# Patient Record
Sex: FEMALE | Race: WHITE | URBAN - METROPOLITAN AREA
[De-identification: names, ages, dates, MRNs, and addresses within clinical notes are randomized per-mention and may not be internally consistent; named-entity substitution may affect disease eponyms.]

---

## 2023-08-31 ENCOUNTER — APPOINTMENT (OUTPATIENT)
Dept: INTERNAL MEDICINE CLINIC | Facility: HOSPITAL | Age: 28
End: 2023-08-31
Attending: EMERGENCY MEDICINE

## 2024-03-18 ENCOUNTER — TELEPHONE (OUTPATIENT)
Dept: FAMILY MEDICINE CLINIC | Facility: CLINIC | Age: 29
End: 2024-03-18

## 2024-04-03 ENCOUNTER — OFFICE VISIT (OUTPATIENT)
Dept: FAMILY MEDICINE CLINIC | Facility: CLINIC | Age: 29
End: 2024-04-03
Payer: COMMERCIAL

## 2024-04-03 ENCOUNTER — LAB ENCOUNTER (OUTPATIENT)
Dept: LAB | Age: 29
End: 2024-04-03
Attending: STUDENT IN AN ORGANIZED HEALTH CARE EDUCATION/TRAINING PROGRAM
Payer: COMMERCIAL

## 2024-04-03 VITALS
BODY MASS INDEX: 20.63 KG/M2 | SYSTOLIC BLOOD PRESSURE: 94 MMHG | HEART RATE: 73 BPM | OXYGEN SATURATION: 100 % | DIASTOLIC BLOOD PRESSURE: 56 MMHG | HEIGHT: 64 IN | WEIGHT: 120.81 LBS

## 2024-04-03 DIAGNOSIS — Z00.00 ENCOUNTER FOR ROUTINE HISTORY AND PHYSICAL EXAMINATION: Primary | ICD-10-CM

## 2024-04-03 DIAGNOSIS — E55.9 VITAMIN D DEFICIENCY: ICD-10-CM

## 2024-04-03 DIAGNOSIS — Z13.6 SCREENING FOR CARDIOVASCULAR CONDITION: ICD-10-CM

## 2024-04-03 DIAGNOSIS — E61.1 IRON DEFICIENCY: ICD-10-CM

## 2024-04-03 DIAGNOSIS — E61.1 IRON DEFICIENCY: Primary | ICD-10-CM

## 2024-04-03 DIAGNOSIS — F41.9 ANXIETY: ICD-10-CM

## 2024-04-03 LAB
ALBUMIN SERPL-MCNC: 4.4 G/DL (ref 3.4–5)
ALBUMIN/GLOB SERPL: 1.3 {RATIO} (ref 1–2)
ALP LIVER SERPL-CCNC: 47 U/L
ALT SERPL-CCNC: 29 U/L
ANION GAP SERPL CALC-SCNC: 10 MMOL/L (ref 0–18)
AST SERPL-CCNC: 24 U/L (ref 15–37)
BASOPHILS # BLD AUTO: 0.04 X10(3) UL (ref 0–0.2)
BASOPHILS NFR BLD AUTO: 0.4 %
BILIRUB SERPL-MCNC: 0.3 MG/DL (ref 0.1–2)
BUN BLD-MCNC: 20 MG/DL (ref 9–23)
CALCIUM BLD-MCNC: 9.6 MG/DL (ref 8.5–10.1)
CHLORIDE SERPL-SCNC: 105 MMOL/L (ref 98–112)
CHOLEST SERPL-MCNC: 132 MG/DL (ref ?–200)
CO2 SERPL-SCNC: 22 MMOL/L (ref 21–32)
CREAT BLD-MCNC: 0.81 MG/DL
DEPRECATED HBV CORE AB SER IA-ACNC: 6.1 NG/ML
EGFRCR SERPLBLD CKD-EPI 2021: 103 ML/MIN/1.73M2 (ref 60–?)
EOSINOPHIL # BLD AUTO: 0.11 X10(3) UL (ref 0–0.7)
EOSINOPHIL NFR BLD AUTO: 1.2 %
ERYTHROCYTE [DISTWIDTH] IN BLOOD BY AUTOMATED COUNT: 17.2 %
FASTING PATIENT LIPID ANSWER: YES
FASTING STATUS PATIENT QL REPORTED: YES
GLOBULIN PLAS-MCNC: 3.5 G/DL (ref 2.8–4.4)
GLUCOSE BLD-MCNC: 78 MG/DL (ref 70–99)
HCT VFR BLD AUTO: 37.3 %
HDLC SERPL-MCNC: 45 MG/DL (ref 40–59)
HGB BLD-MCNC: 12.7 G/DL
IMM GRANULOCYTES # BLD AUTO: 0.03 X10(3) UL (ref 0–1)
IMM GRANULOCYTES NFR BLD: 0.3 %
IRON SATN MFR SERPL: 9 %
IRON SERPL-MCNC: 33 UG/DL
LDLC SERPL CALC-MCNC: 78 MG/DL (ref ?–100)
LYMPHOCYTES # BLD AUTO: 2.23 X10(3) UL (ref 1–4)
LYMPHOCYTES NFR BLD AUTO: 24.8 %
MCH RBC QN AUTO: 27.1 PG (ref 26–34)
MCHC RBC AUTO-ENTMCNC: 34 G/DL (ref 31–37)
MCV RBC AUTO: 79.7 FL
MONOCYTES # BLD AUTO: 0.8 X10(3) UL (ref 0.1–1)
MONOCYTES NFR BLD AUTO: 8.9 %
NEUTROPHILS # BLD AUTO: 5.78 X10 (3) UL (ref 1.5–7.7)
NEUTROPHILS # BLD AUTO: 5.78 X10(3) UL (ref 1.5–7.7)
NEUTROPHILS NFR BLD AUTO: 64.4 %
NONHDLC SERPL-MCNC: 87 MG/DL (ref ?–130)
OSMOLALITY SERPL CALC.SUM OF ELEC: 285 MOSM/KG (ref 275–295)
PLATELET # BLD AUTO: 335 10(3)UL (ref 150–450)
POTASSIUM SERPL-SCNC: 3.8 MMOL/L (ref 3.5–5.1)
PROT SERPL-MCNC: 7.9 G/DL (ref 6.4–8.2)
RBC # BLD AUTO: 4.68 X10(6)UL
SODIUM SERPL-SCNC: 137 MMOL/L (ref 136–145)
TIBC SERPL-MCNC: 356 UG/DL (ref 240–450)
TRANSFERRIN SERPL-MCNC: 239 MG/DL (ref 200–360)
TRIGL SERPL-MCNC: 33 MG/DL (ref 30–149)
VIT D+METAB SERPL-MCNC: 32.3 NG/ML (ref 30–100)
VLDLC SERPL CALC-MCNC: 5 MG/DL (ref 0–30)
WBC # BLD AUTO: 9 X10(3) UL (ref 4–11)

## 2024-04-03 PROCEDURE — 99385 PREV VISIT NEW AGE 18-39: CPT | Performed by: STUDENT IN AN ORGANIZED HEALTH CARE EDUCATION/TRAINING PROGRAM

## 2024-04-03 PROCEDURE — 83540 ASSAY OF IRON: CPT

## 2024-04-03 PROCEDURE — 82306 VITAMIN D 25 HYDROXY: CPT

## 2024-04-03 PROCEDURE — 80053 COMPREHEN METABOLIC PANEL: CPT

## 2024-04-03 PROCEDURE — 36415 COLL VENOUS BLD VENIPUNCTURE: CPT

## 2024-04-03 PROCEDURE — 82728 ASSAY OF FERRITIN: CPT

## 2024-04-03 PROCEDURE — 85025 COMPLETE CBC W/AUTO DIFF WBC: CPT

## 2024-04-03 PROCEDURE — 83550 IRON BINDING TEST: CPT

## 2024-04-03 PROCEDURE — 80061 LIPID PANEL: CPT

## 2024-04-03 RX ORDER — BUSPIRONE HYDROCHLORIDE 15 MG/1
30 TABLET ORAL 2 TIMES DAILY
COMMUNITY
End: 2024-04-03

## 2024-04-03 RX ORDER — SERTRALINE HYDROCHLORIDE 100 MG/1
100 TABLET, FILM COATED ORAL 2 TIMES DAILY
Qty: 180 TABLET | Refills: 3 | Status: SHIPPED | OUTPATIENT
Start: 2024-04-03 | End: 2025-04-03

## 2024-04-03 RX ORDER — BUSPIRONE HYDROCHLORIDE 30 MG/1
30 TABLET ORAL 2 TIMES DAILY
Qty: 180 TABLET | Refills: 3 | Status: SHIPPED | OUTPATIENT
Start: 2024-04-03 | End: 2025-04-03

## 2024-04-03 RX ORDER — SERTRALINE HYDROCHLORIDE 100 MG/1
100 TABLET, FILM COATED ORAL 2 TIMES DAILY
COMMUNITY
End: 2024-04-03

## 2024-04-03 RX ORDER — HYDROXYZINE HYDROCHLORIDE 10 MG/1
10 TABLET, FILM COATED ORAL EVERY 8 HOURS PRN
Qty: 30 TABLET | Refills: 0 | Status: SHIPPED | OUTPATIENT
Start: 2024-04-03

## 2024-04-03 RX ORDER — HYDROXYZINE HYDROCHLORIDE 10 MG/1
10 TABLET, FILM COATED ORAL
COMMUNITY
Start: 2019-06-18 | End: 2024-04-03

## 2024-04-03 NOTE — PROGRESS NOTES
[facility] [place]    CC: yearly exam     HPI: Jenifer Benitez is 28 year old female here for a yearly physical.    1. Anxious mood/depressed mood. Zoloft 100 mg BID. Buspar 30 mg BID. Hydroxyzine PRN. She has been on zoloft for about 12 years. Her mood is well controlled.   2. Healthcare maintenance.  Exercise - enjoys running, occasional weight lifting.  Sunscreen - not discussed.  Caffeine - 2-3 cups per day.  Advanced directives- not yet  3. Iron deficiency, notes last time ferritin was checked it was low at 6. Not endorsing dyspnea, palpitations, dizziness. Needs this checked.  4. Vit D deficiency requests lab check     PMH:  There is no problem list on file for this patient.    No past medical history on file.    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 Social alcohol. No drug use.        ROS: full 10 point review of systems done and otherwise negative.      Healthcare Maintenance:  Pap:  Reportedly UTD 2022 with gyn, records requested  COVID19 vaccine: recommend updated 23-24 covid booster  Guardasil:UTD per pt, records requested  Tdap: UTD per pt, records requested    Health Habits:  Dental Exam. UTD  Eye Exam. UTD       GYN:  Birth Control. abstinence     PE:  Vital Signs    04/03/24 0822   BP: 94/56   Pulse: 73   PainSc: 0 - (None)     Wt Readings from Last 3 Encounters:   04/03/24 120 lb 12.8 oz (54.8 kg)     Body mass index is 20.74 kg/m².     General: Comfortable, pleasant, NAD, appears stated age  HEENT.  NC/AT, no conjunctival injection, TMs clear, oropharynx without erythema or exudate, neck supple, no LAD or thyromegaly  CV.  RRR, no m/r/g  Pulm. CTAB, no W/R/R, comfortable work of breathing, speaks in full sentences  Abdomen. Soft, nt, nd  Extremities.  2+ DP pulses, no edema  Skin.  No concerning moles      No visits with results within 4 Week(s) from this  visit.   Latest known visit with results is:   No results found for any previous visit.        A/P: Jenifer Benitez is 28 year old yo female here for complete physical.  1. Anxious mood/depressed mood. Well-controlled on current regimen. Note high dose zoloft which has been split BID by prior provider - she is doing well with this so no changes made today.  2. Healthcare Maintenance. Discussed eye exam, dentist visit q6 months, sunscreen, exercise at least 5x/week, 30 minutes daily, and limiting caffeine intake.  3. Iron deficiency - labs ordered  4. Vit D deficiency requests lab check        Outpatient Encounter Medications as of 4/3/2024   Medication Sig Dispense Refill    sertraline 100 MG Oral Tab Take 1 tablet (100 mg total) by mouth 2 (two) times daily. 180 tablet 3    hydrOXYzine 10 MG Oral Tab Take 1 tablet (10 mg total) by mouth every 8 (eight) hours as needed for Anxiety. 30 tablet 0    busPIRone 30 MG Oral Tab Take 1 tablet (30 mg total) by mouth 2 (two) times daily. 180 tablet 3    [DISCONTINUED] hydrOXYzine 10 MG Oral Tab Take 1 tablet (10 mg total) by mouth.      [DISCONTINUED] busPIRone 15 MG Oral Tab Take 2 tablets (30 mg total) by mouth 2 (two) times daily.      [DISCONTINUED] sertraline 100 MG Oral Tab Take 1 tablet (100 mg total) by mouth 2 (two) times daily.       No facility-administered encounter medications on file as of 4/3/2024.           Side effects, risks and benefits of medications were explained.  The patient or responsible adult showed the ability to learn, asked appropriate questions.  There were no barriers to learning and they verbalized understanding of the treatment plan.     Medication list provided to patient and /or family member.     This note was created in part by Dragon recognition software.  Please excuse errors.

## 2024-05-03 ENCOUNTER — OFFICE VISIT (OUTPATIENT)
Facility: CLINIC | Age: 29
End: 2024-05-03
Payer: COMMERCIAL

## 2024-05-03 VITALS
HEART RATE: 72 BPM | HEIGHT: 64 IN | DIASTOLIC BLOOD PRESSURE: 60 MMHG | BODY MASS INDEX: 20.55 KG/M2 | SYSTOLIC BLOOD PRESSURE: 112 MMHG | WEIGHT: 120.38 LBS

## 2024-05-03 DIAGNOSIS — Z01.419 ENCOUNTER FOR WELL WOMAN EXAM WITH ROUTINE GYNECOLOGICAL EXAM: Primary | ICD-10-CM

## 2024-05-03 DIAGNOSIS — Z12.4 CERVICAL CANCER SCREENING: ICD-10-CM

## 2024-05-03 PROCEDURE — 87624 HPV HI-RISK TYP POOLED RSLT: CPT | Performed by: OBSTETRICS & GYNECOLOGY

## 2024-05-03 PROCEDURE — 88175 CYTOPATH C/V AUTO FLUID REDO: CPT | Performed by: OBSTETRICS & GYNECOLOGY

## 2024-05-03 PROCEDURE — 99385 PREV VISIT NEW AGE 18-39: CPT | Performed by: OBSTETRICS & GYNECOLOGY

## 2024-05-03 NOTE — PROGRESS NOTES
Jenifer Benitez is a 28 year old female  Patient's last menstrual period was 2024 (approximate).   Chief Complaint   Patient presents with    Wellness Visit     Annual Exam     Other     Patient said YES to student    .  Patient has no complaints, declines b.c - not sexually active  OBSTETRICS HISTORY:  OB History    Para Term  AB Living   0 0 0 0 0 0   SAB IAB Ectopic Multiple Live Births   0 0 0 0 0       GYNE HISTORY:  Periods regular monthly    History   Sexual Activity    Sexual activity: Not on file        Hx Prior Abnormal Pap: No  Pap Date:  ( per patient)  Pap Result Notes: Negative (per patient)        MEDICAL HISTORY:  Past Medical History:    Anxiety    Depression    Dysmenorrhea    Useually managable with 800 mg Ibuprofen       SURGICAL HISTORY:  Past Surgical History:   Procedure Laterality Date    Other surgical history  2014    [place] Teeth removal       SOCIAL HISTORY:  Social History     Socioeconomic History    Marital status: Single     Spouse name: Not on file    Number of children: Not on file    Years of education: Not on file    Highest education level: Not on file   Occupational History    Not on file   Tobacco Use    Smoking status: Never    Smokeless tobacco: Never   Vaping Use    Vaping status: Never Used   Substance and Sexual Activity    Alcohol use: Yes     Comment: Social drinking, not on a weekly basis (maybe once a month)    Drug use: Not Currently     Frequency: 1.0 times per week     Types: Cannabis     Comment: Less than once a week in college    Sexual activity: Not on file   Other Topics Concern    Caffeine Concern No    Exercise No    Seat Belt No    Special Diet No    Stress Concern No    Weight Concern No   Social History 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 Social alcohol. No drug use.     Social Determinants of Health     Financial Resource Strain: Not on file   Food Insecurity: Not on file    Transportation Needs: Not on file   Physical Activity: Not on file   Stress: Not on file   Social Connections: Not on file   Housing Stability: Not on file       FAMILY HISTORY:  Family History   Problem Relation Age of Onset    Diabetes Mother     Hypertension Mother     Obesity Mother     Hypertension Father     Diabetes Maternal Grandmother        MEDICATIONS:    Current Outpatient Medications:     sertraline 100 MG Oral Tab, Take 1 tablet (100 mg total) by mouth 2 (two) times daily., Disp: 180 tablet, Rfl: 3    hydrOXYzine 10 MG Oral Tab, Take 1 tablet (10 mg total) by mouth every 8 (eight) hours as needed for Anxiety., Disp: 30 tablet, Rfl: 0    busPIRone 30 MG Oral Tab, Take 1 tablet (30 mg total) by mouth 2 (two) times daily., Disp: 180 tablet, Rfl: 3    ALLERGIES:    Allergies   Allergen Reactions    Seasonal UNKNOWN         Review of Systems:  Constitutional:  Denies fatigue, night sweats, hot flashes  Eyes:  denies blurred or double vision  Cardiovascular:  denies chest pain or palpitations  Respiratory:  denies shortness of breath  Gastrointestinal:  denies heartburn, abdominal pain, diarrhea or constipation  Genitourinary:  denies dysuria, incontinence, abnormal vaginal discharge, vaginal itching  Musculoskeletal:  denies back pain.  Skin/Breast:  Denies any breast pain, lumps, or discharge.   Neurological:  denies headaches, extremity weakness or numbness.  Psychiatric: denies depression or anxiety.  Endocrine:   denies excessive thirst or urination.  Heme/Lymph:  denies history of anemia, easy bruising or bleeding.      PHYSICAL EXAM:   Constitutional: well developed, well nourished  Head/Face: normocephalic  Neck/Thyroid: thyroid symmetric, no thyromegaly, no nodules, no adenopathy  Lymphatic:no abnormal supraclavicular or axillary adenopathy is noted  Breast: normal without palpable masses, tenderness, asymmetry, nipple discharge, nipple retraction or skin changes  Abdomen:  soft, nontender,  nondistended, no masses  Skin/Hair: no unusual rashes or bruises  Extremities: no edema, no cyanosis  Psychiatric:  Oriented to time, place, person and situation. Appropriate mood and affect    Pelvic Exam:  External Genitalia: normal appearance, hair distribution, and no lesions  Urethral Meatus:  normal in size, location, without lesions and prolapse  Bladder:  No fullness, masses or tenderness  Vagina:  Normal appearance without lesions, no abnormal discharge  Cervix:  Normal without tenderness on motion  Uterus: normal in size, contour, position, mobility, without tenderness  Adnexa: normal without masses or tenderness  Perineum: normal  Anus: no hemorroids     Assessment & Plan:  Diagnoses and all orders for this visit:    Encounter for well woman exam with routine gynecological exam    Cervical cancer screening  -     Hpv High Risk , Thin Prep Collect; Future  -     ThinPrep PAP Smear B; Future

## 2024-05-06 LAB — HPV I/H RISK 1 DNA SPEC QL NAA+PROBE: NEGATIVE

## 2024-05-09 LAB
.: NORMAL
.: NORMAL

## 2024-08-28 ENCOUNTER — LAB ENCOUNTER (OUTPATIENT)
Dept: LAB | Facility: HOSPITAL | Age: 29
End: 2024-08-28
Attending: STUDENT IN AN ORGANIZED HEALTH CARE EDUCATION/TRAINING PROGRAM
Payer: COMMERCIAL

## 2024-08-28 DIAGNOSIS — E61.1 IRON DEFICIENCY: ICD-10-CM

## 2024-08-28 LAB
DEPRECATED HBV CORE AB SER IA-ACNC: 16.7 NG/ML
IRON SATN MFR SERPL: 37 %
IRON SERPL-MCNC: 114 UG/DL
TIBC SERPL-MCNC: 308 UG/DL (ref 250–425)
TRANSFERRIN SERPL-MCNC: 207 MG/DL (ref 250–380)

## 2024-08-28 PROCEDURE — 36415 COLL VENOUS BLD VENIPUNCTURE: CPT

## 2024-08-28 PROCEDURE — 82728 ASSAY OF FERRITIN: CPT

## 2024-08-28 PROCEDURE — 84466 ASSAY OF TRANSFERRIN: CPT

## 2024-08-28 PROCEDURE — 83540 ASSAY OF IRON: CPT

## 2024-08-30 DIAGNOSIS — E61.1 IRON DEFICIENCY: Primary | ICD-10-CM

## 2024-09-03 ENCOUNTER — TELEPHONE (OUTPATIENT)
Dept: INTERNAL MEDICINE CLINIC | Facility: HOSPITAL | Age: 29
End: 2024-09-03

## 2024-09-03 NOTE — TELEPHONE ENCOUNTER
Outside Covid Testing done    Results and RTW guidelines:    COVID RESULT reported:      Test type:    [] Rapid outside         [] PCR outside       [x] Home Test    Date of test: 09/30/2024     Test location: Home         [] Result viewed in Epic with verbal consent received from employee     [x] Results per Employee Covid Dashboard    [] Employee instructed to email copy of result to [email or web address]       [x] Discussed with employee     [] Unable to reach by phone.  Sent via Decal[employer] message        [x] Positive    - Employee should quarantine at home for at least 5 days (day 1 is day after sx onset) , follow the CDC guidelines for cleaning and                              quarantining; see CDC.gov   -This employee may RTW on day 6 if asymptomatic or mildly symptomatic (with improving symptoms).  Call Employee Health on day 5 if unable to return on                      day 6 after symptom onset.    -This employee needs to call Employee Health on day 5 after symptom onset.  The employee needs to be cleared by Employee Health.  - If Employee is still experiencing severe symptoms must make a RTW appt with Employee Health, Employee will not be cleared if:    1. Has consistent cough, shortness of breath or fatigue that restricts your physical activities    2. Is still feeling \"unwell\"    3. Within 15 days of hospitalization for COVID    4. Within 20 days of intubation for COVID    5. Still has a fever, vomiting or diarrhea   - Keep communication open with management about RTW and if symptoms worsen    - Monitor sx and temperature    - Employee should quarantine at home for at least 5 days from sx onset, follow the CDC guidelines for cleaning and quarantining; see CDC.gov                  - Notify PCP of result                 - Seek emergent care with worsening symptoms        Notes:     RTW PLAN:    [x]  If COVID positive results, off work minimum of 5 days from positive test or onset of symptoms (day 0)         On day 5, if asymptomatic or mildly symptomatic (with improving symptoms) may return to work day 6          On day 5, if symptomatic, call Employee Health for RTW screening        []  COVID positive result - call Employee Health on day 5 after symptom onset.  The employee needs to be cleared by Employee Health to RTW.  [] RTW immediately, continue to monitor for sx  [] RTW when sx improve; must be fever free for 24 hours w/o medications, Diarrhea/Vomiting free for 24 hours w/o medications  [] Alinity ordered; continue to monitor sx and call for new/worsening sx.  Discuss RTW guidelines with manager             [] Rapid ordered to confirm home negative.   [] May continue to work  [x] Follow up with PCP  [] Home until further instruction from hotline with Alinity results  INSTRUCTIONS PROVIDED:  [x]  Plan as noted above  []  Length of time to obtain results  []  May return to work if views negative in My Chart and  remains fever, vomiting, and diarrhea free  []  May continue to work if remains asymptomatic   [x]  Quarantine instructions  [x]  Masking protocol  []  S/S of worsening infection/condition and importance of prompt medical re-evaluation including when to seek emergency care  [] If symptoms develop, stay home and call hotline for rapid test order    Estimated RTW date:  09/08/2024  [x] Manager notified        [] MISTY  []ZINA   [x] [facility]  Manager : Shayna Johnson    [] Direct Patient Care  []Indirect Patient Contact   [x] Non-Clinical/No Patient Contact    High Risk Area:    [] Yes   [x] No    For Direct Patient Care ONLY: Have you been fitted with an N95 mask? [] Yes  [x]No      HAVE YOU RECEIVED THE COVID-19 Vaccine? Yes [x]    No []          If yes, date(s) received: 04/06/2021 05/04/2021 10/20/2022           Which vaccine:  Pfizer [x]     Moderna [x]    J&J []      SYMPTOMS (reported via dashboard):  [] asymptomatic  [x] symptomatic  [] GI symptoms only    Symptom onset date: 09/02/2024    Fever   > 100F              Yes [x]      Cough                          Yes [x]      Shortness of breath  Yes []      Congestion                 Yes [x]      Runny nose                Yes [x]        Loss of Smell              Yes []        Loss of Taste             Yes []       Sore throat                 Yes [x]       Fatigue                        Yes [x]       Body Aches                Yes [x]        Chills                           Yes [x]        Headache                   Yes [x]             GI symptoms             Yes []     No [x]                     Nausea   []          Vomiting            []                                    Diarrhea  []          Upset stomach []      Employee reported COVID Exposure?  Yes [x]     No []    Date of exposure: 08/30/2024  []  Coworker                       [] patient                        [x] Family/friend    PPE:   [] N95 Mask/PAPR  [] Standard Mask  [] Eyewear  [x] None    Within 6 feet for >15 minutes? [x] Yes []  No    Is this a true exposure? [x]  Yes []  No    When was the last shift you worked?: 09/02/2024    Employee has a history of Covid?  Yes []     No [x]   If Yes, when:    Notes:

## 2024-10-21 ENCOUNTER — OFFICE VISIT (OUTPATIENT)
Dept: OTHER | Facility: HOSPITAL | Age: 29
End: 2024-10-21
Attending: EMERGENCY MEDICINE

## 2024-10-21 DIAGNOSIS — Z77.21 PERSONAL HISTORY OF EXPOSURE TO POTENTIALLY HAZARDOUS BODY FLUIDS: Primary | ICD-10-CM

## 2024-10-21 LAB
HBV SURFACE AB SER QL: REACTIVE
HBV SURFACE AB SERPL IA-ACNC: >1000 MIU/ML
HCV AB SERPL QL IA: NONREACTIVE

## 2024-10-21 PROCEDURE — 86803 HEPATITIS C AB TEST: CPT

## 2024-10-21 PROCEDURE — 86706 HEP B SURFACE ANTIBODY: CPT

## 2024-10-21 PROCEDURE — 87389 HIV-1 AG W/HIV-1&-2 AB AG IA: CPT

## 2025-03-05 DIAGNOSIS — F41.9 ANXIETY: ICD-10-CM

## 2025-03-05 RX ORDER — BUSPIRONE HYDROCHLORIDE 30 MG/1
30 TABLET ORAL 2 TIMES DAILY
Qty: 180 TABLET | Refills: 2 | OUTPATIENT
Start: 2025-03-05

## 2025-03-06 RX ORDER — SERTRALINE HYDROCHLORIDE 100 MG/1
100 TABLET, FILM COATED ORAL 2 TIMES DAILY
Qty: 180 TABLET | Refills: 0 | Status: SHIPPED | OUTPATIENT
Start: 2025-03-06 | End: 2026-03-06

## 2025-03-06 RX ORDER — BUSPIRONE HYDROCHLORIDE 30 MG/1
30 TABLET ORAL 2 TIMES DAILY
Qty: 180 TABLET | Refills: 0 | Status: SHIPPED | OUTPATIENT
Start: 2025-03-06 | End: 2026-03-06

## 2025-03-06 NOTE — TELEPHONE ENCOUNTER
Requested Prescriptions     Signed Prescriptions Disp Refills    sertraline 100 MG Oral Tab 180 tablet 0     Sig: Take 1 tablet (100 mg total) by mouth 2 (two) times daily.     Authorizing Provider: MEL MILLER     Ordering User: YOU YOUNG      Refilled per protocol/OV notes

## 2025-03-06 NOTE — TELEPHONE ENCOUNTER
Patient comment: I have an appointment scheduled in April and don't have enough medication to make it that long. Requesting 1 refill to cover until that appointment.     Requested Prescriptions     Pending Prescriptions Disp Refills    busPIRone HCl 30 MG Oral Tab 180 tablet 3     Sig: Take 1 tablet (30 mg total) by mouth 2 (two) times daily.        Last refill: 4/3/24 180 tabs 3 refills    Last Appointment: LOV 4/3/2024     Next Appointment: 4/9/2025 Lupis Villalobos MD

## 2025-03-17 ENCOUNTER — TELEPHONE (OUTPATIENT)
Dept: FAMILY MEDICINE CLINIC | Facility: CLINIC | Age: 30
End: 2025-03-17

## 2025-03-17 DIAGNOSIS — Z13.0 SCREENING FOR BLOOD DISEASE: Primary | ICD-10-CM

## 2025-03-17 DIAGNOSIS — E61.1 IRON DEFICIENCY: ICD-10-CM

## 2025-03-17 DIAGNOSIS — Z13.29 SCREENING FOR THYROID DISORDER: ICD-10-CM

## 2025-03-17 DIAGNOSIS — E55.9 VITAMIN D DEFICIENCY: ICD-10-CM

## 2025-03-17 DIAGNOSIS — Z13.228 SCREENING FOR METABOLIC DISORDER: ICD-10-CM

## 2025-03-17 NOTE — TELEPHONE ENCOUNTER
Please enter lab orders for the patient's upcoming physical appointment.     Physical scheduled:   Your appointments       Date & Time Appointment Department ([place])    Apr 09, 2025 8:00 AM CDT Adult Physical with Lupis Villalobos MD [facility] ([facility])    PLEASE NOTE - Most insurances allow a Complete Physical once every 366 days. Please schedule accordingly.    Please arrive 15 minutes prior to your scheduled appointment. Please also bring your Insurance card, Photo ID, and your medication bottles or a list of your current medication.    If you no longer require this appointment, please contact your physician office to cancel.              [facility] [place]  [address] [id number] [phone]           Preferred lab: [facility] ([facility])     The patient has been notified to complete fasting labs prior to their physical appointment.

## 2025-03-28 ENCOUNTER — APPOINTMENT (OUTPATIENT)
Dept: LAB | Age: 30
End: 2025-03-28

## 2025-04-04 ENCOUNTER — LAB ENCOUNTER (OUTPATIENT)
Dept: LAB | Age: 30
End: 2025-04-04
Attending: STUDENT IN AN ORGANIZED HEALTH CARE EDUCATION/TRAINING PROGRAM
Payer: COMMERCIAL

## 2025-04-04 DIAGNOSIS — Z13.0 SCREENING FOR BLOOD DISEASE: ICD-10-CM

## 2025-04-04 DIAGNOSIS — E61.1 IRON DEFICIENCY: ICD-10-CM

## 2025-04-04 DIAGNOSIS — Z13.29 SCREENING FOR THYROID DISORDER: ICD-10-CM

## 2025-04-04 DIAGNOSIS — Z13.228 SCREENING FOR METABOLIC DISORDER: ICD-10-CM

## 2025-04-04 DIAGNOSIS — E55.9 VITAMIN D DEFICIENCY: ICD-10-CM

## 2025-04-04 LAB
ALBUMIN SERPL-MCNC: 4.8 G/DL (ref 3.2–4.8)
ALBUMIN/GLOB SERPL: 1.8 {RATIO} (ref 1–2)
ALP LIVER SERPL-CCNC: 44 U/L
ALT SERPL-CCNC: 48 U/L
ANION GAP SERPL CALC-SCNC: 9 MMOL/L (ref 0–18)
AST SERPL-CCNC: 44 U/L (ref ?–34)
BASOPHILS # BLD AUTO: 0.03 X10(3) UL (ref 0–0.2)
BASOPHILS NFR BLD AUTO: 0.4 %
BILIRUB SERPL-MCNC: 0.5 MG/DL (ref 0.3–1.2)
BUN BLD-MCNC: 21 MG/DL (ref 9–23)
CALCIUM BLD-MCNC: 9.7 MG/DL (ref 8.7–10.6)
CHLORIDE SERPL-SCNC: 103 MMOL/L (ref 98–112)
CO2 SERPL-SCNC: 28 MMOL/L (ref 21–32)
CREAT BLD-MCNC: 0.88 MG/DL
DEPRECATED HBV CORE AB SER IA-ACNC: 16 NG/ML
EGFRCR SERPLBLD CKD-EPI 2021: 92 ML/MIN/1.73M2 (ref 60–?)
EOSINOPHIL # BLD AUTO: 0.1 X10(3) UL (ref 0–0.7)
EOSINOPHIL NFR BLD AUTO: 1.4 %
ERYTHROCYTE [DISTWIDTH] IN BLOOD BY AUTOMATED COUNT: 11.8 %
FASTING STATUS PATIENT QL REPORTED: YES
GLOBULIN PLAS-MCNC: 2.6 G/DL (ref 2–3.5)
GLUCOSE BLD-MCNC: 86 MG/DL (ref 70–99)
HCT VFR BLD AUTO: 41.3 %
HGB BLD-MCNC: 14.6 G/DL
IMM GRANULOCYTES # BLD AUTO: 0.02 X10(3) UL (ref 0–1)
IMM GRANULOCYTES NFR BLD: 0.3 %
IRON SATN MFR SERPL: 28 %
IRON SERPL-MCNC: 76 UG/DL
LYMPHOCYTES # BLD AUTO: 2.14 X10(3) UL (ref 1–4)
LYMPHOCYTES NFR BLD AUTO: 30.1 %
MCH RBC QN AUTO: 31.5 PG (ref 26–34)
MCHC RBC AUTO-ENTMCNC: 35.4 G/DL (ref 31–37)
MCV RBC AUTO: 89.2 FL
MONOCYTES # BLD AUTO: 0.58 X10(3) UL (ref 0.1–1)
MONOCYTES NFR BLD AUTO: 8.2 %
NEUTROPHILS # BLD AUTO: 4.23 X10 (3) UL (ref 1.5–7.7)
NEUTROPHILS # BLD AUTO: 4.23 X10(3) UL (ref 1.5–7.7)
NEUTROPHILS NFR BLD AUTO: 59.6 %
OSMOLALITY SERPL CALC.SUM OF ELEC: 292 MOSM/KG (ref 275–295)
PLATELET # BLD AUTO: 326 10(3)UL (ref 150–450)
POTASSIUM SERPL-SCNC: 3.8 MMOL/L (ref 3.5–5.1)
PROT SERPL-MCNC: 7.4 G/DL (ref 5.7–8.2)
RBC # BLD AUTO: 4.63 X10(6)UL
SODIUM SERPL-SCNC: 140 MMOL/L (ref 136–145)
TOTAL IRON BINDING CAPACITY: 267 UG/DL (ref 250–425)
TRANSFERRIN SERPL-MCNC: 191 MG/DL (ref 250–380)
TSI SER-ACNC: 2.61 UIU/ML (ref 0.55–4.78)
VIT D+METAB SERPL-MCNC: 29.9 NG/ML (ref 30–100)
WBC # BLD AUTO: 7.1 X10(3) UL (ref 4–11)

## 2025-04-04 PROCEDURE — 82728 ASSAY OF FERRITIN: CPT

## 2025-04-04 PROCEDURE — 85025 COMPLETE CBC W/AUTO DIFF WBC: CPT

## 2025-04-04 PROCEDURE — 80053 COMPREHEN METABOLIC PANEL: CPT

## 2025-04-04 PROCEDURE — 83540 ASSAY OF IRON: CPT

## 2025-04-04 PROCEDURE — 82306 VITAMIN D 25 HYDROXY: CPT

## 2025-04-04 PROCEDURE — 84443 ASSAY THYROID STIM HORMONE: CPT

## 2025-04-04 PROCEDURE — 83550 IRON BINDING TEST: CPT

## 2025-04-04 PROCEDURE — 36415 COLL VENOUS BLD VENIPUNCTURE: CPT

## 2025-04-08 PROBLEM — F40.01 PANIC DISORDER WITH AGORAPHOBIA: Status: ACTIVE | Noted: 2017-06-06

## 2025-04-09 ENCOUNTER — OFFICE VISIT (OUTPATIENT)
Dept: FAMILY MEDICINE CLINIC | Facility: CLINIC | Age: 30
End: 2025-04-09
Payer: COMMERCIAL

## 2025-04-09 VITALS
WEIGHT: 123 LBS | HEART RATE: 79 BPM | HEIGHT: 63.78 IN | BODY MASS INDEX: 21.26 KG/M2 | OXYGEN SATURATION: 100 % | DIASTOLIC BLOOD PRESSURE: 70 MMHG | SYSTOLIC BLOOD PRESSURE: 108 MMHG | RESPIRATION RATE: 20 BRPM

## 2025-04-09 DIAGNOSIS — F41.9 ANXIETY: ICD-10-CM

## 2025-04-09 DIAGNOSIS — Z00.00 ENCOUNTER FOR ROUTINE HISTORY AND PHYSICAL EXAMINATION: Primary | ICD-10-CM

## 2025-04-09 DIAGNOSIS — H61.22 IMPACTED CERUMEN OF LEFT EAR: ICD-10-CM

## 2025-04-09 DIAGNOSIS — E55.9 VITAMIN D DEFICIENCY: ICD-10-CM

## 2025-04-09 DIAGNOSIS — E61.1 IRON DEFICIENCY: ICD-10-CM

## 2025-04-09 PROCEDURE — 99395 PREV VISIT EST AGE 18-39: CPT | Performed by: STUDENT IN AN ORGANIZED HEALTH CARE EDUCATION/TRAINING PROGRAM

## 2025-04-09 PROCEDURE — 99214 OFFICE O/P EST MOD 30 MIN: CPT | Performed by: STUDENT IN AN ORGANIZED HEALTH CARE EDUCATION/TRAINING PROGRAM

## 2025-04-09 RX ORDER — BUSPIRONE HYDROCHLORIDE 30 MG/1
30 TABLET ORAL 2 TIMES DAILY
Qty: 180 TABLET | Refills: 3 | Status: SHIPPED | OUTPATIENT
Start: 2025-04-09 | End: 2026-04-09

## 2025-04-09 RX ORDER — HYDROXYZINE HYDROCHLORIDE 10 MG/1
10 TABLET, FILM COATED ORAL EVERY 8 HOURS PRN
Qty: 30 TABLET | Refills: 0 | Status: SHIPPED | OUTPATIENT
Start: 2025-04-09

## 2025-04-09 RX ORDER — SERTRALINE HYDROCHLORIDE 100 MG/1
100 TABLET, FILM COATED ORAL 2 TIMES DAILY
Qty: 180 TABLET | Refills: 3 | Status: SHIPPED | OUTPATIENT
Start: 2025-04-09 | End: 2026-04-09

## 2025-04-09 NOTE — PROGRESS NOTES
The following individual(s) verbally consented to be recorded using ambient AI listening technology and understand that they can each withdraw their consent to this listening technology at any point by asking the clinician to turn off or pause the recording:    Patient name: Jenifer Bobyeimi

## 2025-04-09 NOTE — PROGRESS NOTES
[facility] - [place]    CC: yearly exam     HPI: Jenifer Benitez is 29 year old female here for a yearly physical.  1. Healthcare maintenance.  Her exercise routine includes weightlifting and running with her brother, as well as attending spin classes with a coworker. She consumes coffee for caffeine intake.  She lives with her brother and her cats.  2. Low Ferritin at 16. CBC and Iron and TIBC in normal range. She has a history of low ferritin levels and has been taking iron supplements inconsistently due to gastrointestinal upset when taken on an empty stomach. She recently restarted taking 27 mg ferrous gluconate, three to four pills at a time, and is considering adjusting the frequency to manage side effects like constipation.   3. Anxious mood/depressed mood. Zoloft 100 mg BID. Buspar 30 mg BID. Hydroxyzine PRN. Her mood is well controlled. Note high dose zoloft which has been split BID by prior provider and continued by me at her last physical. She recently switched jobs, which was stressful but has improved her work-life balance and reduced anxiety. She enjoys the new work environment and feels respected by management.  4. Iron deficiency. Her menstrual periods are regular and manageable, with the first couple of days being slightly heavy but not unmanageable. She is not currently using any form of contraception and does not wish to start any.    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 Social alcohol. No drug use.        ROS: full 10 point review of systems done and otherwise negative.      Healthcare Maintenance:  Depression screen: negative/normal  Osteoporosis screen: No history of pathologic fractures. No steroid use, smoking, risk of falls, excessive alcohol use.    Health  Maintenance   Topic Date Due    Annual Physical  04/03/2025    Pap Smear  05/03/2027    DTaP,Tdap,and Td Vaccines (6 - Td or Tdap) 06/16/2031    Influenza Vaccine  Completed    Annual Depression Screening  Completed    COVID-19 Vaccine  Completed    Pneumococcal Vaccine: Birth to 50yrs  Aged Out    Meningococcal B Vaccine  Aged Out       Health Habits:  Dental Exam. UTD  Eye Exam. UTD          Immunization History   Administered Date(s) Administered    >=3 YRS TRI  MULTIDOSE VIAL (80302) FLU CLINIC 10/31/2016, 10/20/2023    Covid-19 Vaccine Moderna 100 mcg/0.5 ml 04/06/2021, 05/04/2021, 11/20/2021    Covid-19 Vaccine Pfizer Bivalent 30mcg/0.3mL 10/20/2022    DTAP 08/12/1997, 10/13/1997, 12/15/1997, 06/16/1998    FLUZONE 6 months and older PFS 0.5 ml (38599) 12/24/2019, 10/20/2022    Flucelvax Influenza vaccine, trivalent (ccIIV3), 0.5mL IM 11/09/2024    HEP A 05/01/2014, 12/16/2014    HIB 08/12/1997, 10/13/1997, 12/15/1997    Hpv Virus Vaccine 9 Radha Im 08/09/2010, 10/12/2010, 02/15/2011    IPV 08/12/1997, 10/13/1997, 12/15/1997, 07/16/2003    Influenza 05/19/2014    MMR 06/16/1998, 07/16/2003    Meningococcal-Menactra 07/16/2003, 05/01/2014    Meningococcal-Menveo 2month-55yr 03/20/2024    Pfizer Covid-19 Vaccine 30mcg/0.3ml 12yrs+ 11/09/2024    TDAP 06/16/2021    Varicella 07/16/2003, 05/01/2014     Obesity screening: Body mass index is 21.26 kg/m².  Diabetes screening:    Chemistry Labs:   No results found for: \"A1C\"     Hypercholesterolemia screening:   Lab Results   Component Value Date    HDL 45 04/03/2024     Lab Results   Component Value Date    LDL 78 04/03/2024     Lab Results   Component Value Date    TRIG 33 04/03/2024            Health Habits:  Tobacco use:  reports that she has never smoked. She has never used smokeless tobacco.       GYN:  Birth Control. delicnes  Menstrual/Menopausal Hx. Regular/manageable      PE:  Vital Signs    04/09/25 0813   BP: 108/70   Pulse: 79   Resp: 20     Wt Readings from  Last 3 Encounters:   04/09/25 123 lb (55.8 kg)   05/03/24 120 lb 6.4 oz (54.6 kg)   04/03/24 120 lb 12.8 oz (54.8 kg)     Body mass index is 21.26 kg/m².     General: Comfortable, pleasant, NAD, appears stated age  HEENT.  NC/AT, no conjunctival injection, TMs left cerumen impaction, right normal TM,  oropharynx without erythema or exudate, neck supple, no LAD or thyromegaly  CV.  RRR, no m/r/g  Pulm. CTAB, no W/R/R, comfortable work of breathing, speaks in full sentences  Abdomen. Soft, nt, nd  .  defer  Extremities.  2+ DP pulses, no edema  Skin.  No concerning moles       [facility] Lab Encounter on 04/04/2025   Component Date Value Ref Range Status    Ferritin 04/04/2025 16 (L)  50 - 306 ng/mL Final    Iron 04/04/2025 76  50 - 170 ug/dL Final    Transferrin 04/04/2025 191 (L)  250 - 380 mg/dL Final    Total Iron Binding Capacity 04/04/2025 267  250 - 425 ug/dL Final    % Saturation 04/04/2025 28  15 - 50 % Final    WBC 04/04/2025 7.1  4.0 - 11.0 x10(3) uL Final    RBC 04/04/2025 4.63  3.80 - 5.30 x10(6)uL Final    HGB 04/04/2025 14.6  12.0 - 16.0 g/dL Final    HCT 04/04/2025 41.3  35.0 - 48.0 % Final    PLT 04/04/2025 326.0  150.0 - 450.0 10(3)uL Final    MCV 04/04/2025 89.2  80.0 - 100.0 fL Final    MCH 04/04/2025 31.5  26.0 - 34.0 pg Final    MCHC 04/04/2025 35.4  31.0 - 37.0 g/dL Final    RDW 04/04/2025 11.8  % Final    Neutrophil Absolute Prelim 04/04/2025 4.23  1.50 - 7.70 x10 (3) uL Final    Neutrophil Absolute 04/04/2025 4.23  1.50 - 7.70 x10(3) uL Final    Lymphocyte Absolute 04/04/2025 2.14  1.00 - 4.00 x10(3) uL Final    Monocyte Absolute 04/04/2025 0.58  0.10 - 1.00 x10(3) uL Final    Eosinophil Absolute 04/04/2025 0.10  0.00 - 0.70 x10(3) uL Final    Basophil Absolute 04/04/2025 0.03  0.00 - 0.20 x10(3) uL Final    Immature Granulocyte Absolute 04/04/2025 0.02  0.00 - 1.00 x10(3) uL Final    Neutrophil % 04/04/2025 59.6  % Final    Lymphocyte % 04/04/2025 30.1  % Final    Monocyte % 04/04/2025 8.2  %  Final    Eosinophil % 04/04/2025 1.4  % Final    Basophil % 04/04/2025 0.4  % Final    Immature Granulocyte % 04/04/2025 0.3  % Final    Glucose 04/04/2025 86  70 - 99 mg/dL Final    Sodium 04/04/2025 140  136 - 145 mmol/L Final    Potassium 04/04/2025 3.8  3.5 - 5.1 mmol/L Final    Chloride 04/04/2025 103  98 - 112 mmol/L Final    CO2 04/04/2025 28.0  21.0 - 32.0 mmol/L Final    Anion Gap 04/04/2025 9  0 - 18 mmol/L Final    BUN 04/04/2025 21  9 - 23 mg/dL Final    Creatinine 04/04/2025 0.88  0.55 - 1.02 mg/dL Final    Calcium, Total 04/04/2025 9.7  8.7 - 10.6 mg/dL Final    Calculated Osmolality 04/04/2025 292  275 - 295 mOsm/kg Final    eGFR-Cr 04/04/2025 92  >=60 mL/min/1.73m2 Final    AST 04/04/2025 44 (H)  <34 U/L Final    ALT 04/04/2025 48  10 - 49 U/L Final    Alkaline Phosphatase 04/04/2025 44  37 - 98 U/L Final    Bilirubin, Total 04/04/2025 0.5  0.3 - 1.2 mg/dL Final    Total Protein 04/04/2025 7.4  5.7 - 8.2 g/dL Final    Albumin 04/04/2025 4.8  3.2 - 4.8 g/dL Final    Globulin  04/04/2025 2.6  2.0 - 3.5 g/dL Final    A/G Ratio 04/04/2025 1.8  1.0 - 2.0 Final    Patient Fasting for CMP? 04/04/2025 Yes   Final    TSH 04/04/2025 2.609  0.550 - 4.780 uIU/mL Final    Vitamin D, 25OH, Total 04/04/2025 29.9 (L)  30.0 - 100.0 ng/mL Final        A/P: Jenifer Benitez is 29 year old yo female here for complete physical.  1. Healthcare Maintenance. Discussed eye exam, dentist visit q6 months, sunscreen, exercise at least 5x/week, 30 minutes daily, and limiting caffeine intake.   Assessment & Plan  Iron deficiency  Ferritin levels low, indicating ongoing deficiency. Normal CBC suggests no significant anemia. Emphasized consistent intake of iron supplements to improve symptoms.  - Continue ferrous gluconate 27 mg daily  - Consider every other day dosing if gastrointestinal side effects occur.  - Take Colace for constipation management.  - Recheck iron levels in 8 weeks.    Anxious mood/depressed mood  Anxiety  manageable with current medications. Recent job change beneficial for mental health, reducing stress and improving mood.  - Continue Sertraline 100 mg BID, Buspirone 30 mg BID, and Hydroxyzine 10 mg as needed.  - Follow up in one year unless symptoms or medication efficacy change.    Vitamin D deficiency  Vitamin D levels low. Advised to increase dietary intake to 600 IU per day.  - Increase dietary intake of vitamin D to 600 IU per day using provided dietary chart.  - Consider supplementation if dietary intake is insufficient.    Ear wax buildup  Blocked left ear due to ear wax, possibly affecting hearing. Recommended Debrox to soften wax.  - Use Debrox ear drops in the left ear for one week.       Encounter Medications[3]        Side effects, risks and benefits of medications were explained.  The patient or responsible adult showed the ability to learn, asked appropriate questions.  There were no barriers to learning and they verbalized understanding of the treatment plan.     Medication list provided to patient and /or family member.     This note was created in part by Dragon recognition software.  Please excuse errors.                 [1]   Patient Active Problem List  Diagnosis    Panic disorder with agoraphobia    Anxiety disorder   [2]   Past Medical History:   Anxiety    Depression    Dysmenorrhea    Useually managable with 800 mg Ibuprofen   [3]   Outpatient Encounter Medications as of 4/9/2025   Medication Sig Dispense Refill    busPIRone HCl 30 MG Oral Tab Take 1 tablet (30 mg total) by mouth 2 (two) times daily. 180 tablet 0    sertraline 100 MG Oral Tab Take 1 tablet (100 mg total) by mouth 2 (two) times daily. 180 tablet 0    hydrOXYzine 10 MG Oral Tab Take 1 tablet (10 mg total) by mouth every 8 (eight) hours as needed for Anxiety. 30 tablet 0     No facility-administered encounter medications on file as of 4/9/2025.

## 2025-05-09 ENCOUNTER — LAB ENCOUNTER (OUTPATIENT)
Dept: LAB | Age: 30
End: 2025-05-09
Attending: STUDENT IN AN ORGANIZED HEALTH CARE EDUCATION/TRAINING PROGRAM
Payer: COMMERCIAL

## 2025-05-09 DIAGNOSIS — E61.1 IRON DEFICIENCY: ICD-10-CM

## 2025-05-09 LAB — DEPRECATED HBV CORE AB SER IA-ACNC: 14 NG/ML (ref 50–306)

## 2025-05-09 PROCEDURE — 82728 ASSAY OF FERRITIN: CPT

## 2025-05-09 PROCEDURE — 36415 COLL VENOUS BLD VENIPUNCTURE: CPT

## 2025-05-10 ENCOUNTER — PATIENT MESSAGE (OUTPATIENT)
Dept: FAMILY MEDICINE CLINIC | Facility: CLINIC | Age: 30
End: 2025-05-10

## 2025-05-10 DIAGNOSIS — E61.1 IRON DEFICIENCY: Primary | ICD-10-CM

## 2025-05-14 NOTE — TELEPHONE ENCOUNTER
Pt asking for provider suggestion for referral. Referral is open and likely needs a provider attached to it.

## 2025-05-15 ENCOUNTER — TELEPHONE (OUTPATIENT)
Facility: LOCATION | Age: 30
End: 2025-05-15

## 2025-06-02 NOTE — PROGRESS NOTES
[facility] Note    Visit Diagnosis:  1. Anemia, unspecified type        History of Present Illness: 29F referred by Dr. Villalobos for anemia. Her ferritin has been low since 2024 (ferritin 6-16). Her HB is normal.    She feels fatigued. This has been going for about a year. She has been on oral iron for about 1 year without improvement in ferritin or symptoms. She did not tolerate higher doses of oral iron with constipation. Her periods are generally fairly heavy. She is going to see gynecology.       Review of Systems: 12 Point ROS was completed and pertinent positives are in the HPI    Medications Ordered Prior to Encounter[1]  Past Medical History[2]  Past Surgical History[3]  Set as collapsible by default.[4]   Family History[5]    Physical Exam  Height: 162.6 cm (5' 4\") (06/04 1105)  Weight: 57.8 kg (127 lb 6.4 oz) (06/04 1105)  BSA (Calculated - sq m): 1.61 sq meters (06/04 1105)  Pulse: 83 (06/04 1105)  BP: 114/72 (06/04 1105)  Temp: 98.4 °F (36.9 °C) (06/04 1105)  Do Not Use - Resp Rate: --  SpO2: 96 % (06/04 1105)  General: NAD, AOX3  HEENT: clear op, mmm, no jvd, no scleral icterus  CV: RR  Extremities: No edema   Lungs:no increased work of breathing  Abd: nondistended  Neuro: CN: II-XII grossly intact    Results:  Lab Results   Component Value Date    WBC 7.1 04/04/2025    HGB 14.6 04/04/2025    HCT 41.3 04/04/2025    MCV 89.2 04/04/2025    .0 04/04/2025       No results for input(s): \"GLU\", \"BUN\", \"CREATSERUM\", \"GFRAA\", \"GFRNAA\", \"EGFRCR\", \"CA\", \"ALB\", \"NA\", \"K\", \"CL\", \"CO2\", \"ALKPHO\", \"AST\", \"ALT\", \"BILT\", \"TP\" in the last 168 hours.    Radiology: n/a    Pathology: n/a    Assessment and Plan:  Iron Deficiency  -Likely from menorrhagia.   -She is not anemic  -No improvement with oral iron.   -IV InFED x 1. Risks and benefits discussed   -Repeat Labs in 3 months. If no improvement, will refer to GI    MDM:high-IV Iron     KIRBY Granado MD  [facility]           [1]   Current Outpatient Medications on File Prior to Visit   Medication Sig Dispense Refill    ferrous sulfate 325 (65 FE) MG Oral Tab EC Take 1 tablet (325 mg total) by mouth daily with breakfast.      busPIRone HCl 30 MG Oral Tab Take 1 tablet (30 mg total) by mouth 2 (two) times daily. 180 tablet 3    hydrOXYzine 10 MG Oral Tab Take 1 tablet (10 mg total) by mouth every 8 (eight) hours as needed for Anxiety. 30 tablet 0    sertraline 100 MG Oral Tab Take 1 tablet (100 mg total) by mouth 2 (two) times daily. 180 tablet 3     No current facility-administered medications on file prior to visit.   [2]   Past Medical History:   Anxiety    Depression    Dysmenorrhea    Useually managable with 800 mg Ibuprofen   [3]   Past Surgical History:  Procedure Laterality Date    Other surgical history  11/2014    [place] Teeth removal   [4]   Social History  Socioeconomic History    Marital status: Single   Tobacco Use    Smoking status: Never    Smokeless tobacco: Never   Vaping Use    Vaping status: Never Used   Substance and Sexual Activity    Alcohol use: Yes     Comment: Social drinking, not on a weekly basis (maybe once a month)    Drug use: Not Currently     Frequency: 1.0 times per week     Types: Cannabis     Comment: Less than once a week in college   [5]   Family History  Problem Relation Age of Onset    Diabetes Mother     Hypertension Mother     Obesity Mother     Hypertension Father     Diabetes Maternal Grandmother

## 2025-06-04 ENCOUNTER — OFFICE VISIT (OUTPATIENT)
Age: 30
End: 2025-06-04
Attending: INTERNAL MEDICINE
Payer: COMMERCIAL

## 2025-06-04 ENCOUNTER — TELEPHONE (OUTPATIENT)
Age: 30
End: 2025-06-04

## 2025-06-04 VITALS
WEIGHT: 127.38 LBS | BODY MASS INDEX: 21.75 KG/M2 | TEMPERATURE: 98 F | OXYGEN SATURATION: 96 % | HEIGHT: 64 IN | RESPIRATION RATE: 16 BRPM | DIASTOLIC BLOOD PRESSURE: 72 MMHG | SYSTOLIC BLOOD PRESSURE: 114 MMHG | HEART RATE: 83 BPM

## 2025-06-04 DIAGNOSIS — D64.9 ANEMIA, UNSPECIFIED TYPE: Primary | ICD-10-CM

## 2025-06-04 RX ORDER — FERROUS SULFATE 325(65) MG
325 TABLET, DELAYED RELEASE (ENTERIC COATED) ORAL
COMMUNITY

## 2025-06-04 NOTE — PROGRESS NOTES
Education Record    Learner:  Patient    Disease / Diagnosis:iron def.     Barriers / Limitations:  None   Comments:    Method:  Discussion   Comments:    General Topics:  Plan of care reviewed   Comments:    Outcome:  Shows understanding   Comments:   Pt reports that menses are pretty heavy.   Has been taking oral iron, and Ferritin went up a little then plateaued

## 2025-06-18 ENCOUNTER — OFFICE VISIT (OUTPATIENT)
Age: 30
End: 2025-06-18
Attending: INTERNAL MEDICINE
Payer: COMMERCIAL

## 2025-06-18 VITALS
TEMPERATURE: 98 F | DIASTOLIC BLOOD PRESSURE: 71 MMHG | RESPIRATION RATE: 16 BRPM | OXYGEN SATURATION: 97 % | HEART RATE: 67 BPM | SYSTOLIC BLOOD PRESSURE: 107 MMHG | WEIGHT: 127.19 LBS | BODY MASS INDEX: 21.71 KG/M2 | HEIGHT: 64.02 IN

## 2025-06-18 DIAGNOSIS — D64.9 ANEMIA, UNSPECIFIED TYPE: Primary | ICD-10-CM

## 2025-06-18 NOTE — PROGRESS NOTES
Pt here for infed. Pt denies any issues or concerns.      Ordering Provider: Loy  Order Exp: after today's dose     Pt tolerated infusion without difficulty or complaint. Reviewed next apt date/time: labs in 3 months      Education Record  Learner:  Patient  Disease / Diagnosis: anemia  Barriers / Limitations:  None  Method:  Discussion and Reinforcement  General Topics:  Medication and Plan of care reviewed  Outcome:  Shows understanding

## 2025-07-18 ENCOUNTER — OFFICE VISIT (OUTPATIENT)
Facility: CLINIC | Age: 30
End: 2025-07-18
Payer: COMMERCIAL

## 2025-07-18 VITALS
DIASTOLIC BLOOD PRESSURE: 66 MMHG | WEIGHT: 124 LBS | HEART RATE: 70 BPM | BODY MASS INDEX: 21.17 KG/M2 | SYSTOLIC BLOOD PRESSURE: 100 MMHG | HEIGHT: 64 IN

## 2025-07-18 DIAGNOSIS — Z12.4 CERVICAL CANCER SCREENING: ICD-10-CM

## 2025-07-18 DIAGNOSIS — Z01.419 ENCOUNTER FOR WELL WOMAN EXAM WITH ROUTINE GYNECOLOGICAL EXAM: Primary | ICD-10-CM

## 2025-07-18 PROCEDURE — 99395 PREV VISIT EST AGE 18-39: CPT | Performed by: OBSTETRICS & GYNECOLOGY

## 2025-07-18 PROCEDURE — 88175 CYTOPATH C/V AUTO FLUID REDO: CPT | Performed by: OBSTETRICS & GYNECOLOGY

## 2025-07-18 RX ORDER — CHOLECALCIFEROL (VITAMIN D3) 25 MCG
1000 TABLET ORAL DAILY
COMMUNITY

## 2025-07-18 NOTE — PROGRESS NOTES
Jenifer Benitez is a 30 year old female  Patient's last menstrual period was 2025 (exact date).   Chief Complaint   Patient presents with    Gyn Problem     Pt c/o heavy periods for the past 4 months.   .Patient has been diagnosed with iron deficiency anemia, had iron transfusion. She feels her period became heavier over the past 4 months. Discussed OCP - patient will consider> Her mother has h/o endometriosis - discussed with patient that heavy bleeding is not usually symptom of it.  Patient is not sexually active  OBSTETRICS HISTORY:  OB History    Para Term  AB Living   0 0 0 0 0 0   SAB IAB Ectopic Multiple Live Births   0 0 0 0 0       GYNE HISTORY:  Periods regular monthly    History   Sexual Activity    Sexual activity: Not Currently        Pap Date: 24  Pap Result Notes: neg/neg        MEDICAL HISTORY:  Past Medical History[1]    SURGICAL HISTORY:  Past Surgical History[2]    SOCIAL HISTORY:  Social History     Socioeconomic History    Marital status: Single     Spouse name: Not on file    Number of children: Not on file    Years of education: Not on file    Highest education level: Not on file   Occupational History    Not on file   Tobacco Use    Smoking status: Never    Smokeless tobacco: Never   Vaping Use    Vaping status: Never Used   Substance and Sexual Activity    Alcohol use: Yes     Comment: Social drinking, not on a weekly basis (maybe once a month)    Drug use: Not Currently     Frequency: 1.0 times per week     Types: Cannabis     Comment: Less than once a week in college    Sexual activity: Not Currently   Other Topics Concern    Caffeine Concern No    Exercise No    Seat Belt No    Special Diet No    Stress Concern No    Weight Concern No   Social History 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 Social alcohol. No drug use.     Social Drivers of Health     Food Insecurity: Not on file   Transportation Needs: Not on file    Stress: Not on file   Housing Stability: Not on file       FAMILY HISTORY:  Family History[3]    MEDICATIONS:  Medications - Current[4]    ALLERGIES:  Allergies[5]      Review of Systems:  Constitutional:  Denies fatigue, night sweats, hot flashes  Eyes:  denies blurred or double vision  Cardiovascular:  denies chest pain or palpitations  Respiratory:  denies shortness of breath  Gastrointestinal:  denies heartburn, abdominal pain, diarrhea or constipation  Genitourinary:  denies dysuria, incontinence, abnormal vaginal discharge, vaginal itching  Musculoskeletal:  denies back pain.  Skin/Breast:  Denies any breast pain, lumps, or discharge.   Neurological:  denies headaches, extremity weakness or numbness.  Psychiatric: denies depression or anxiety.  Endocrine:   denies excessive thirst or urination.  Heme/Lymph:  denies history of anemia, easy bruising or bleeding.      PHYSICAL EXAM:   Constitutional: well developed, well nourished  Head/Face: normocephalic  Neck/Thyroid: thyroid symmetric, no thyromegaly, no nodules, no adenopathy  Lymphatic:no abnormal supraclavicular or axillary adenopathy is noted  Breast: normal without palpable masses, tenderness, asymmetry, nipple discharge, nipple retraction or skin changes  Abdomen:  soft, nontender, nondistended, no masses  Skin/Hair: no unusual rashes or bruises  Extremities: no edema, no cyanosis  Psychiatric:  Oriented to time, place, person and situation. Appropriate mood and affect    Pelvic Exam:  External Genitalia: normal appearance, hair distribution, and no lesions  Urethral Meatus:  normal in size, location, without lesions and prolapse  Bladder:  No fullness, masses or tenderness  Vagina:  Normal appearance without lesions, no abnormal discharge  Cervix:  Normal without tenderness on motion  Uterus: normal in size, contour, position, mobility, without tenderness  Adnexa: normal without masses or tenderness  Perineum: normal  Anus: no hemorroids      Assessment & Plan:  Diagnoses and all orders for this visit:    Encounter for well woman exam with routine gynecological exam    Cervical cancer screening  -     ThinPrep PAP with HPV Reflex Request B                   [1]   Past Medical History:   Anxiety    Depression    Dysmenorrhea    Useually managable with 800 mg Ibuprofen   [2]   Past Surgical History:  Procedure Laterality Date    Other surgical history  11/2014    [place] Teeth removal   [3]   Family History  Problem Relation Age of Onset    Diabetes Mother     Hypertension Mother     Obesity Mother     Hypertension Father     Diabetes Maternal Grandmother    [4]   Current Outpatient Medications:     cholecalciferol 25 MCG (1000 UT) Oral Tab, Take 1 tablet (1,000 Units total) by mouth daily., Disp: , Rfl:     busPIRone HCl 30 MG Oral Tab, Take 1 tablet (30 mg total) by mouth 2 (two) times daily., Disp: 180 tablet, Rfl: 3    hydrOXYzine 10 MG Oral Tab, Take 1 tablet (10 mg total) by mouth every 8 (eight) hours as needed for Anxiety., Disp: 30 tablet, Rfl: 0    sertraline 100 MG Oral Tab, Take 1 tablet (100 mg total) by mouth 2 (two) times daily., Disp: 180 tablet, Rfl: 3    ferrous sulfate 325 (65 FE) MG Oral Tab EC, Take 1 tablet (325 mg total) by mouth daily with breakfast., Disp: , Rfl:   [5]   Allergies  Allergen Reactions    Seasonal UNKNOWN